# Patient Record
Sex: FEMALE | Race: OTHER | URBAN - METROPOLITAN AREA
[De-identification: names, ages, dates, MRNs, and addresses within clinical notes are randomized per-mention and may not be internally consistent; named-entity substitution may affect disease eponyms.]

---

## 2017-09-05 ENCOUNTER — EMERGENCY (EMERGENCY)
Facility: HOSPITAL | Age: 37
LOS: 1 days | Discharge: HOME | End: 2017-09-05
Attending: EMERGENCY MEDICINE | Admitting: EMERGENCY MEDICINE
Payer: COMMERCIAL

## 2017-09-05 VITALS
HEIGHT: 62 IN | DIASTOLIC BLOOD PRESSURE: 77 MMHG | RESPIRATION RATE: 18 BRPM | TEMPERATURE: 98 F | SYSTOLIC BLOOD PRESSURE: 110 MMHG | HEART RATE: 77 BPM | WEIGHT: 188.27 LBS | OXYGEN SATURATION: 100 %

## 2017-09-05 VITALS
HEART RATE: 79 BPM | SYSTOLIC BLOOD PRESSURE: 100 MMHG | DIASTOLIC BLOOD PRESSURE: 68 MMHG | RESPIRATION RATE: 18 BRPM | TEMPERATURE: 99 F | OXYGEN SATURATION: 96 %

## 2017-09-05 DIAGNOSIS — R07.89 OTHER CHEST PAIN: ICD-10-CM

## 2017-09-05 LAB
ALBUMIN SERPL ELPH-MCNC: 4.2 G/DL — SIGNIFICANT CHANGE UP (ref 3.3–5)
ALP SERPL-CCNC: 64 U/L — SIGNIFICANT CHANGE UP (ref 40–120)
ALT FLD-CCNC: 15 U/L — SIGNIFICANT CHANGE UP (ref 10–45)
ANION GAP SERPL CALC-SCNC: 12 MMOL/L — SIGNIFICANT CHANGE UP (ref 5–17)
APPEARANCE UR: CLEAR — SIGNIFICANT CHANGE UP
AST SERPL-CCNC: 18 U/L — SIGNIFICANT CHANGE UP (ref 10–40)
BASOPHILS NFR BLD AUTO: 0.2 % — SIGNIFICANT CHANGE UP (ref 0–2)
BILIRUB SERPL-MCNC: 0.2 MG/DL — SIGNIFICANT CHANGE UP (ref 0.2–1.2)
BILIRUB UR-MCNC: NEGATIVE — SIGNIFICANT CHANGE UP
BUN SERPL-MCNC: 17 MG/DL — SIGNIFICANT CHANGE UP (ref 7–23)
CALCIUM SERPL-MCNC: 9.2 MG/DL — SIGNIFICANT CHANGE UP (ref 8.4–10.5)
CHLORIDE SERPL-SCNC: 99 MMOL/L — SIGNIFICANT CHANGE UP (ref 96–108)
CK MB CFR SERPL CALC: 1.9 NG/ML — SIGNIFICANT CHANGE UP (ref 0–6.7)
CK SERPL-CCNC: 147 U/L — SIGNIFICANT CHANGE UP (ref 25–170)
CO2 SERPL-SCNC: 24 MMOL/L — SIGNIFICANT CHANGE UP (ref 22–31)
COLOR SPEC: YELLOW — SIGNIFICANT CHANGE UP
CREAT SERPL-MCNC: 0.7 MG/DL — SIGNIFICANT CHANGE UP (ref 0.5–1.3)
DIFF PNL FLD: NEGATIVE — SIGNIFICANT CHANGE UP
EOSINOPHIL NFR BLD AUTO: 0.6 % — SIGNIFICANT CHANGE UP (ref 0–6)
EXTRA BLUE TOP TUBE: SIGNIFICANT CHANGE UP
EXTRA SST TUBE: SIGNIFICANT CHANGE UP
GLUCOSE SERPL-MCNC: 93 MG/DL — SIGNIFICANT CHANGE UP (ref 70–99)
GLUCOSE UR QL: NEGATIVE — SIGNIFICANT CHANGE UP
HCT VFR BLD CALC: 36.2 % — SIGNIFICANT CHANGE UP (ref 34.5–45)
HGB BLD-MCNC: 12.1 G/DL — SIGNIFICANT CHANGE UP (ref 11.5–15.5)
KETONES UR-MCNC: NEGATIVE — SIGNIFICANT CHANGE UP
LEUKOCYTE ESTERASE UR-ACNC: NEGATIVE — SIGNIFICANT CHANGE UP
LYMPHOCYTES # BLD AUTO: 27.9 % — SIGNIFICANT CHANGE UP (ref 13–44)
MCHC RBC-ENTMCNC: 28.7 PG — SIGNIFICANT CHANGE UP (ref 27–34)
MCHC RBC-ENTMCNC: 33.4 G/DL — SIGNIFICANT CHANGE UP (ref 32–36)
MCV RBC AUTO: 85.8 FL — SIGNIFICANT CHANGE UP (ref 80–100)
MONOCYTES NFR BLD AUTO: 6.4 % — SIGNIFICANT CHANGE UP (ref 2–14)
NEUTROPHILS NFR BLD AUTO: 64.9 % — SIGNIFICANT CHANGE UP (ref 43–77)
NITRITE UR-MCNC: NEGATIVE — SIGNIFICANT CHANGE UP
PH UR: 5.5 — SIGNIFICANT CHANGE UP (ref 5–8)
PLATELET # BLD AUTO: 297 K/UL — SIGNIFICANT CHANGE UP (ref 150–400)
POTASSIUM SERPL-MCNC: 4.2 MMOL/L — SIGNIFICANT CHANGE UP (ref 3.5–5.3)
POTASSIUM SERPL-SCNC: 4.2 MMOL/L — SIGNIFICANT CHANGE UP (ref 3.5–5.3)
PROT SERPL-MCNC: 8.3 G/DL — SIGNIFICANT CHANGE UP (ref 6–8.3)
PROT UR-MCNC: NEGATIVE MG/DL — SIGNIFICANT CHANGE UP
RBC # BLD: 4.22 M/UL — SIGNIFICANT CHANGE UP (ref 3.8–5.2)
RBC # FLD: 14.5 % — SIGNIFICANT CHANGE UP (ref 10.3–16.9)
SODIUM SERPL-SCNC: 135 MMOL/L — SIGNIFICANT CHANGE UP (ref 135–145)
SP GR SPEC: 1.01 — SIGNIFICANT CHANGE UP (ref 1–1.03)
TROPONIN T SERPL-MCNC: <0.01 NG/ML — SIGNIFICANT CHANGE UP (ref 0–0.01)
UROBILINOGEN FLD QL: 0.2 E.U./DL — SIGNIFICANT CHANGE UP
WBC # BLD: 8.8 K/UL — SIGNIFICANT CHANGE UP (ref 3.8–10.5)
WBC # FLD AUTO: 8.8 K/UL — SIGNIFICANT CHANGE UP (ref 3.8–10.5)

## 2017-09-05 PROCEDURE — 84484 ASSAY OF TROPONIN QUANT: CPT

## 2017-09-05 PROCEDURE — 99285 EMERGENCY DEPT VISIT HI MDM: CPT | Mod: 25

## 2017-09-05 PROCEDURE — 81003 URINALYSIS AUTO W/O SCOPE: CPT

## 2017-09-05 PROCEDURE — 85025 COMPLETE CBC W/AUTO DIFF WBC: CPT

## 2017-09-05 PROCEDURE — 36415 COLL VENOUS BLD VENIPUNCTURE: CPT

## 2017-09-05 PROCEDURE — 93005 ELECTROCARDIOGRAM TRACING: CPT

## 2017-09-05 PROCEDURE — 93010 ELECTROCARDIOGRAM REPORT: CPT

## 2017-09-05 PROCEDURE — 82550 ASSAY OF CK (CPK): CPT

## 2017-09-05 PROCEDURE — 82553 CREATINE MB FRACTION: CPT

## 2017-09-05 PROCEDURE — 80053 COMPREHEN METABOLIC PANEL: CPT

## 2017-09-05 PROCEDURE — 99283 EMERGENCY DEPT VISIT LOW MDM: CPT | Mod: 25

## 2017-09-05 NOTE — ED PROVIDER NOTE - DISPOSITION TYPE
Spoke with patient; states that over the past weekend he had a andreina horse in Left Thigh - Since then it has been turning black and blue and swollen.  States that the pain has subsided but the discoloration and swelling is still there.    No history of DVT/not use of anticoagulation medications    OV available today or ED   DISCHARGE

## 2017-09-05 NOTE — ED PROVIDER NOTE - MEDICAL DECISION MAKING DETAILS
pt with midsternal cp without associated symptoms except for dizziness, no CAD or pe risk factors, labs in ED wnl - pt asymptomatic at discharge - will follow up as outpt with pmd

## 2017-09-05 NOTE — ED PROVIDER NOTE - OBJECTIVE STATEMENT
38 y/o f with no sig pmh presents to ED with slight sharp midsternal chest pain intermittent associated with dizziness.  Denies dyspnea, nausea, diaphoresis.  No CAD or PE risk factors.

## 2020-03-11 ENCOUNTER — APPOINTMENT (OUTPATIENT)
Dept: VASCULAR SURGERY | Facility: CLINIC | Age: 40
End: 2020-03-11
Payer: COMMERCIAL

## 2020-03-11 DIAGNOSIS — Z87.891 PERSONAL HISTORY OF NICOTINE DEPENDENCE: ICD-10-CM

## 2020-03-11 DIAGNOSIS — M79.A29 NONTRAUMATIC COMPARTMENT SYNDROME OF UNSPECIFIED LOWER EXTREMITY: ICD-10-CM

## 2020-03-11 PROBLEM — Z00.00 ENCOUNTER FOR PREVENTIVE HEALTH EXAMINATION: Status: ACTIVE | Noted: 2020-03-11

## 2020-03-11 PROCEDURE — 99244 OFF/OP CNSLTJ NEW/EST MOD 40: CPT

## 2020-03-18 NOTE — CONSULT LETTER
[Dear  ___] : Dear  [unfilled], [FreeTextEntry2] : Yvette Saldivar DPM\par [address]\[place] [address]\par \par Becky Chu MD\par [address]\par [place], [place]  [FreeTextEntry1] : Thank you for referring Ms Tina Gregg for evaluation. She is a very active runner for many years now. Since January 2020, she has not been able to run comfortably for more than 1.5 miles because she develops pain in the left calf accompanied by burning and tingling sensation. The pain and numbness are located throughout the calf.  It involves the posterior, lateral, medial and anterior muscles, i.e., the entire calf.  The pain takes a long while to cyndie.  Deep massaging helps a little. She denies any symptoms in the right leg. She reports the symptoms beginning around the time and after the episode of plantar fasciitis of the left foot.   \par \par On exam, she has weak but palpable pulses bilaterally with adequate capillary refill. Very mild bilateral ankle; swelling. The left posterior calf is tender to palpation and muscle is well developed. Right calf is soft and nontender. \par \par Her symptoms are not classic compartment related.  since they seemed to temporally begin after the plantar fasciitis I believe it is reasonable to allow her activity level to first return to normal.  This make take many months.  She should reduce her activity level and stop running.  She should continue with physical therapy prescribed by you. I suspect her symptoms are likely related to the change in walking and running related to the episode of plantar fasciitis.  Once she has completely recovered from plantar fasciitis, she may slowly increase her activity level and we will reevaluate her symptoms. She will come to see me again in 2-3 months; depending on her symptoms, a MRI might be warranted. \par \par My complete EMR office note is below for your records.  [FreeTextEntry3] : Sincerely, \par \par Cyril Becker M.D. \par , [facility]\par , Department of Surgery [facility]\par Professor of Surgery, [employer] at [facility]

## 2020-03-18 NOTE — PHYSICAL EXAM
[Respiratory Effort] : normal respiratory effort [No Rash or Lesion] : No rash or lesion [Alert] : alert [Oriented to Person] : oriented to person [Oriented to Place] : oriented to place [Oriented to Time] : oriented to time [Calm] : calm [1+] : left 1+ [Normal Rate and Rhythm] : normal rate and rhythm [JVD] : no jugular venous distention  [Varicose Veins Of Lower Extremities] : not present [] : not present [de-identified] : Well-appearing, NAD [de-identified] : NCAT [FreeTextEntry1] :  Well -developed LLE calf muscle with palpation on posterior aspect with palpation. Mild bilateral ankle edema L>R. [de-identified] : FROM.

## 2020-03-18 NOTE — END OF VISIT
[FreeTextEntry3] : All medical record entries made by the Scribe were at my, Dr. Becker's, discretion and personally dictated by me on 03/11/2020 . I have reviewed the chart and agree that the record accurately reflects my personal performance of the history, physical exam, assessment and plan. I have also personally directed, reviewed and agreed to the chart.\par

## 2020-03-18 NOTE — ASSESSMENT
[Arterial/Venous Disease] : arterial/venous disease [FreeTextEntry1] : 38 y/o F, active runner with possible chronic LLE compartment syndrome and underlying LLE plantar fasciitis. On exam, patient has weak but palpable pulses bilaterally. Left posterior calf is tender to palpation and muscle is well developed. She is advised to reduce exercise activities for now and instructed not to run for more than a mile as well as to continue with PT. Once she has recovered from her plantar fasciitis, she may slowly increase her level of exercise and we will reevaluate her symptoms then before determining a plan of care. She will likely need to obtain an MRI. She is to follow up here in 2-3 months. Will communicate with Dr. Saldivar about patient's visit today.

## 2020-03-18 NOTE — ADDENDUM
[FreeTextEntry1] : This note was written by Jenn Cottrell on 03/11/2020 acting as scribe for Dr. Becker.

## 2020-03-18 NOTE — HISTORY OF PRESENT ILLNESS
[FreeTextEntry1] : 38 y/o F former smoker with no significant PMHx. She has been referred by her podiatrist, Dr Saldivar for evaluation of left calf pain to rule out chronic compartment syndrome. She leads a very active lifestyle, performing strength training and participating in marathons very often. She recently completed a marathon in October 2019 with no issues. She runs about 4 days a week and over 15 miles per week. At the beginning of this year, she started experiencing tingling to her left leg accompanied by pain specifically to her left posterior calf and burning sensations to her left anterior calf. She adds that the symptoms begin to manifest after running about ~1.5miles. She stopped running 2 weeks ago due to her symptoms. DEnies any symptoms on the right leg. She does admit that around the same time in January, she developed plantar fasciitis on the left foot. She has been evaluated by her PCP for this and is currently receiving PT as well as recent steroid injections. She has not obtained a MRI but reports an ultrasound and xrays were completed; she does not have the report with her today.\par \par Patient works as an  and mostly performs desk work.

## 2021-03-06 ENCOUNTER — TRANSCRIPTION ENCOUNTER (OUTPATIENT)
Age: 41
End: 2021-03-06

## 2024-03-06 NOTE — ED ADULT NURSE NOTE - PLAN OF CARE
PT calling re: left arm pain.    Says at 1pm, started to feel lightheaded and her eyes started twitching. Also felt shaky. She decided to eat something, which she says helped a little. Then started to get some pain in her left shoulder and radiating down to her elbow. No recent heavy lifting or injury to her arm.     Pain in her arm is letting up now, but still feels a little shaky. Pt was able to drive herself home from work. Is ambulating independently. Denies chest pain, difficulty breathing, headache or neck pain. Does not feel dizzy anymore. Says left arm is moderately painful. Denies any swelling.     Protocol recommends home care at this time. Reviewed care advice and call back information.Pt verbalized understanding.    Minnie Theodore, RN, BSN  [facility]   Triage Nurse Advisor  Reason for Disposition   Arm pain    Additional Information   Negative: Shock suspected (e.g., cold/pale/clammy skin, too weak to stand, low BP, rapid pulse)   Negative: Similar pain previously and it was from 'heart attack'   Negative: Similar pain previously from 'angina' and not relieved by nitroglycerin   Negative: Sounds like a life-threatening emergency to the triager   Negative: Followed an injury to arm   Negative: Chest pain   Negative: Wound looks infected   Negative: Elbow pain is main symptom   Negative: Hand or wrist pain is main symptom   Negative: Difficulty breathing or unusual sweating (e.g., sweating without exertion)   Negative: Chest pain lasting longer than 5 minutes   Negative: Age > 40 and no obvious cause for pain, pain still present even when not moving the arm   Negative: Fever and red area (or area very tender to touch)   Negative: Swollen joint and fever   Negative: Entire arm is swollen   Negative: Patient sounds very sick or weak to the triager   Negative: SEVERE pain (e.g., excruciating, unable to do any normal activities)   Negative: Red area or streak > 2 inches (or 5 cm)   Negative: Cast on  wrist or arm and now increasing pain   Negative: Weakness (i.e., loss of strength) in hand or fingers  (Exception: Not truly weak; hand feels weak because of pain.)   Negative: Arm pains with exertion (e.g., occurs with walking; goes away on resting)   Negative: Painful rash with multiple small blisters grouped together (i.e., dermatomal distribution or 'band' or 'stripe')   Negative: Looks like a boil, infected sore, deep ulcer, or other infected rash (spreading redness, pus)   Negative: Localized rash is very painful (no fever)   Negative: MODERATE pain (e.g., interferes with normal activities) and present > 3 days   Negative: Pain is worsened or caused by bending the neck   Negative: Numbness (i.e., loss of sensation) in hand or fingers   Negative: Localized pain, redness or hard lump along vein   Negative: Patient wants to be seen   Negative: MILD pain (e.g., does not interfere with normal activities) and present > 7 days   Negative: Arm pain is a chronic symptom (recurrent or ongoing AND lasting > 4 weeks)    Protocols used: Arm Pain-A-[place]     Explanation of exam/test